# Patient Record
Sex: FEMALE | Race: OTHER | NOT HISPANIC OR LATINO | URBAN - METROPOLITAN AREA
[De-identification: names, ages, dates, MRNs, and addresses within clinical notes are randomized per-mention and may not be internally consistent; named-entity substitution may affect disease eponyms.]

---

## 2022-02-08 ENCOUNTER — OUTPATIENT (OUTPATIENT)
Dept: OUTPATIENT SERVICES | Facility: HOSPITAL | Age: 27
LOS: 1 days | End: 2022-02-08
Payer: MEDICAID

## 2022-02-08 VITALS
OXYGEN SATURATION: 99 % | SYSTOLIC BLOOD PRESSURE: 107 MMHG | WEIGHT: 117.95 LBS | TEMPERATURE: 98 F | HEIGHT: 63 IN | DIASTOLIC BLOOD PRESSURE: 73 MMHG

## 2022-02-08 DIAGNOSIS — Z98.891 HISTORY OF UTERINE SCAR FROM PREVIOUS SURGERY: Chronic | ICD-10-CM

## 2022-02-08 DIAGNOSIS — R10.2 PELVIC AND PERINEAL PAIN: ICD-10-CM

## 2022-02-08 DIAGNOSIS — N83.209 UNSPECIFIED OVARIAN CYST, UNSPECIFIED SIDE: ICD-10-CM

## 2022-02-08 DIAGNOSIS — Z01.818 ENCOUNTER FOR OTHER PREPROCEDURAL EXAMINATION: ICD-10-CM

## 2022-02-08 DIAGNOSIS — N80.0 ENDOMETRIOSIS OF UTERUS: ICD-10-CM

## 2022-02-08 LAB
BLD GP AB SCN SERPL QL: SIGNIFICANT CHANGE UP
HCG SERPL-ACNC: <1 MIU/ML — SIGNIFICANT CHANGE UP
HCT VFR BLD CALC: 36.8 % — SIGNIFICANT CHANGE UP (ref 34.5–45)
HGB BLD-MCNC: 11.9 G/DL — SIGNIFICANT CHANGE UP (ref 11.5–15.5)
MCHC RBC-ENTMCNC: 26.7 PG — LOW (ref 27–34)
MCHC RBC-ENTMCNC: 32.3 GM/DL — SIGNIFICANT CHANGE UP (ref 32–36)
MCV RBC AUTO: 82.7 FL — SIGNIFICANT CHANGE UP (ref 80–100)
NRBC # BLD: 0 /100 WBCS — SIGNIFICANT CHANGE UP (ref 0–0)
PLATELET # BLD AUTO: 232 K/UL — SIGNIFICANT CHANGE UP (ref 150–400)
RBC # BLD: 4.45 M/UL — SIGNIFICANT CHANGE UP (ref 3.8–5.2)
RBC # FLD: 14.3 % — SIGNIFICANT CHANGE UP (ref 10.3–14.5)
WBC # BLD: 3.97 K/UL — SIGNIFICANT CHANGE UP (ref 3.8–10.5)
WBC # FLD AUTO: 3.97 K/UL — SIGNIFICANT CHANGE UP (ref 3.8–10.5)

## 2022-02-08 PROCEDURE — 86900 BLOOD TYPING SEROLOGIC ABO: CPT

## 2022-02-08 PROCEDURE — 86901 BLOOD TYPING SEROLOGIC RH(D): CPT

## 2022-02-08 PROCEDURE — 84702 CHORIONIC GONADOTROPIN TEST: CPT

## 2022-02-08 PROCEDURE — 86850 RBC ANTIBODY SCREEN: CPT

## 2022-02-08 PROCEDURE — G0463: CPT

## 2022-02-08 PROCEDURE — 85027 COMPLETE CBC AUTOMATED: CPT

## 2022-02-08 PROCEDURE — 36415 COLL VENOUS BLD VENIPUNCTURE: CPT

## 2022-02-08 NOTE — H&P PST ADULT - HISTORY OF PRESENT ILLNESS
27 yo Black F for laparoscopic left ovarian cystectomy    treatment of endometriosis   Lysis adhesions   2/15/22      LMP 22   c/o intermittent pain left flank area > 6 mos

## 2022-02-08 NOTE — H&P PST ADULT - ASSESSMENT
25 yo Black F c/o pain left flank area for laparoscopic left ovarian cystectomy   treatment of endometriosis   lysis of adhesions   2/ 15/ 22     Medical clearance pending w labs      COVID swab TBS

## 2022-02-14 RX ORDER — SODIUM CHLORIDE 9 MG/ML
1000 INJECTION, SOLUTION INTRAVENOUS
Refills: 0 | Status: DISCONTINUED | OUTPATIENT
Start: 2022-02-15 | End: 2022-02-15

## 2022-02-14 NOTE — ASU PATIENT PROFILE, ADULT - FALL HARM RISK - UNIVERSAL INTERVENTIONS
Bed in lowest position, wheels locked, appropriate side rails in place/Call bell, personal items and telephone in reach/Non-slip footwear when patient is out of bed/[place] to call system/Physically safe environment - no spills, clutter or unnecessary equipment/Purposeful Proactive Rounding/Room/bathroom lighting operational, light cord in reach

## 2022-02-15 ENCOUNTER — INPATIENT (INPATIENT)
Facility: HOSPITAL | Age: 27
LOS: 0 days | Discharge: HOME | DRG: 743 | End: 2022-02-16
Attending: OBSTETRICS & GYNECOLOGY | Admitting: OBSTETRICS & GYNECOLOGY
Payer: MEDICAID

## 2022-02-15 VITALS
OXYGEN SATURATION: 95 % | DIASTOLIC BLOOD PRESSURE: 89 MMHG | RESPIRATION RATE: 14 BRPM | HEART RATE: 88 BPM | HEIGHT: 63 IN | WEIGHT: 117.95 LBS | SYSTOLIC BLOOD PRESSURE: 130 MMHG | TEMPERATURE: 98 F

## 2022-02-15 DIAGNOSIS — R10.2 PELVIC AND PERINEAL PAIN: ICD-10-CM

## 2022-02-15 DIAGNOSIS — Z98.891 HISTORY OF UTERINE SCAR FROM PREVIOUS SURGERY: Chronic | ICD-10-CM

## 2022-02-15 DIAGNOSIS — N80.0 ENDOMETRIOSIS OF UTERUS: ICD-10-CM

## 2022-02-15 PROCEDURE — 88305 TISSUE EXAM BY PATHOLOGIST: CPT | Mod: 26

## 2022-02-15 DEVICE — ARISTA 3GR: Type: IMPLANTABLE DEVICE | Status: FUNCTIONAL

## 2022-02-15 RX ORDER — SODIUM CHLORIDE 9 MG/ML
1000 INJECTION, SOLUTION INTRAVENOUS
Refills: 0 | Status: DISCONTINUED | OUTPATIENT
Start: 2022-02-15 | End: 2022-02-16

## 2022-02-15 RX ORDER — ONDANSETRON 8 MG/1
4 TABLET, FILM COATED ORAL ONCE
Refills: 0 | Status: DISCONTINUED | OUTPATIENT
Start: 2022-02-15 | End: 2022-02-15

## 2022-02-15 RX ORDER — TRAMADOL HYDROCHLORIDE 50 MG/1
1 TABLET ORAL
Qty: 0 | Refills: 0 | DISCHARGE

## 2022-02-15 RX ORDER — ONDANSETRON 8 MG/1
4 TABLET, FILM COATED ORAL EVERY 8 HOURS
Refills: 0 | Status: DISCONTINUED | OUTPATIENT
Start: 2022-02-15 | End: 2022-02-16

## 2022-02-15 RX ORDER — OXYCODONE HYDROCHLORIDE 5 MG/1
5 TABLET ORAL ONCE
Refills: 0 | Status: DISCONTINUED | OUTPATIENT
Start: 2022-02-15 | End: 2022-02-15

## 2022-02-15 RX ORDER — IBUPROFEN 200 MG
600 TABLET ORAL EVERY 8 HOURS
Refills: 0 | Status: DISCONTINUED | OUTPATIENT
Start: 2022-02-16 | End: 2022-02-16

## 2022-02-15 RX ORDER — HYDROMORPHONE HYDROCHLORIDE 2 MG/ML
0.5 INJECTION INTRAMUSCULAR; INTRAVENOUS; SUBCUTANEOUS EVERY 4 HOURS
Refills: 0 | Status: DISCONTINUED | OUTPATIENT
Start: 2022-02-15 | End: 2022-02-16

## 2022-02-15 RX ORDER — ENOXAPARIN SODIUM 100 MG/ML
40 INJECTION SUBCUTANEOUS DAILY
Refills: 0 | Status: DISCONTINUED | OUTPATIENT
Start: 2022-02-16 | End: 2022-02-16

## 2022-02-15 RX ORDER — KETOROLAC TROMETHAMINE 30 MG/ML
30 SYRINGE (ML) INJECTION EVERY 8 HOURS
Refills: 0 | Status: COMPLETED | OUTPATIENT
Start: 2022-02-15 | End: 2022-02-16

## 2022-02-15 RX ORDER — SODIUM CHLORIDE 9 MG/ML
1000 INJECTION, SOLUTION INTRAVENOUS
Refills: 0 | Status: DISCONTINUED | OUTPATIENT
Start: 2022-02-15 | End: 2022-02-15

## 2022-02-15 RX ORDER — ACETAMINOPHEN 500 MG
1000 TABLET ORAL EVERY 8 HOURS
Refills: 0 | Status: DISCONTINUED | OUTPATIENT
Start: 2022-02-15 | End: 2022-02-16

## 2022-02-15 RX ORDER — MAGNESIUM HYDROXIDE 400 MG/1
30 TABLET, CHEWABLE ORAL DAILY
Refills: 0 | Status: DISCONTINUED | OUTPATIENT
Start: 2022-02-15 | End: 2022-02-16

## 2022-02-15 RX ORDER — HYDROMORPHONE HYDROCHLORIDE 2 MG/ML
0.5 INJECTION INTRAMUSCULAR; INTRAVENOUS; SUBCUTANEOUS
Refills: 0 | Status: DISCONTINUED | OUTPATIENT
Start: 2022-02-15 | End: 2022-02-15

## 2022-02-15 RX ORDER — APREPITANT 80 MG/1
40 CAPSULE ORAL ONCE
Refills: 0 | Status: COMPLETED | OUTPATIENT
Start: 2022-02-15 | End: 2022-02-15

## 2022-02-15 RX ORDER — GENTAMICIN SULFATE 40 MG/ML
80 VIAL (ML) INJECTION ONCE
Refills: 0 | Status: COMPLETED | OUTPATIENT
Start: 2022-02-15 | End: 2022-02-15

## 2022-02-15 RX ORDER — OXYCODONE HYDROCHLORIDE 5 MG/1
5 TABLET ORAL EVERY 6 HOURS
Refills: 0 | Status: DISCONTINUED | OUTPATIENT
Start: 2022-02-15 | End: 2022-02-16

## 2022-02-15 RX ADMIN — HYDROMORPHONE HYDROCHLORIDE 0.5 MILLIGRAM(S): 2 INJECTION INTRAMUSCULAR; INTRAVENOUS; SUBCUTANEOUS at 22:32

## 2022-02-15 RX ADMIN — Medication 30 MILLIGRAM(S): at 22:32

## 2022-02-15 RX ADMIN — SODIUM CHLORIDE 75 MILLILITER(S): 9 INJECTION, SOLUTION INTRAVENOUS at 14:06

## 2022-02-15 RX ADMIN — HYDROMORPHONE HYDROCHLORIDE 0.5 MILLIGRAM(S): 2 INJECTION INTRAMUSCULAR; INTRAVENOUS; SUBCUTANEOUS at 21:17

## 2022-02-15 RX ADMIN — Medication 1000 MILLIGRAM(S): at 22:17

## 2022-02-15 RX ADMIN — Medication 1000 MILLIGRAM(S): at 21:17

## 2022-02-15 RX ADMIN — Medication 30 MILLIGRAM(S): at 21:17

## 2022-02-15 RX ADMIN — SODIUM CHLORIDE 100 MILLILITER(S): 9 INJECTION, SOLUTION INTRAVENOUS at 08:26

## 2022-02-15 RX ADMIN — APREPITANT 40 MILLIGRAM(S): 80 CAPSULE ORAL at 08:49

## 2022-02-15 NOTE — PATIENT PROFILE ADULT - FALL HARM RISK - UNIVERSAL INTERVENTIONS
Bed in lowest position, wheels locked, appropriate side rails in place/Call bell, personal items and telephone in reach/Instruct patient to call for assistance before getting out of bed or chair/Non-slip footwear when patient is out of bed/[place] to call system/Physically safe environment - no spills, clutter or unnecessary equipment/Purposeful Proactive Rounding/Room/bathroom lighting operational, light cord in reach

## 2022-02-15 NOTE — PATIENT PROFILE ADULT - NSPRONUTRITIONRISK_GEN_A_NUR
Renal Diet  c/w Renagel 800mg POTID with meals   Calcijex on HD Renal Diet  c/w Renagel 800mg POTID with meals No indicators present

## 2022-02-15 NOTE — CHART NOTE - NSCHARTNOTEFT_GEN_A_CORE
CALLED BY JINA LOCKETT PATIENT WOULD LIKE TO SPEAK WITH DR. CHAUDHARI OR ASSISTANT PRESENT AT THE TIME OF SURGERY    ADVISED RN TO CALL DR. CHAUDHARI AND PUT PATIENT IN CONTACT WITH HIM

## 2022-02-15 NOTE — BRIEF OPERATIVE NOTE - NSICDXBRIEFPROCEDURE_GEN_ALL_CORE_FT
PROCEDURES:  Excision, endometrioma, laparoscopic 15-Feb-2022 13:36:16 from bowel,  diaphragmatic wall, posterior cul de sac Jaclyn Rivera  Cystoscopy 15-Feb-2022 13:42:14  Jaclyn Rivera  Hysteroscopy, without dilation and curettage 15-Feb-2022 13:42:34  Jaclyn Rivera

## 2022-02-15 NOTE — BRIEF OPERATIVE NOTE - OPERATION/FINDINGS
intense adhesive/ infiltrative endometriosis noted on diaphragmatic wall, posterior cul de sac and large bowel, with adhesions of large bowel to anterior margins of uterus, left endometrioma

## 2022-02-15 NOTE — BRIEF OPERATIVE NOTE - NSICDXBRIEFPREOP_GEN_ALL_CORE_FT
PRE-OP DIAGNOSIS:  Endometrioma 15-Feb-2022 13:35:00  Jaclyn Rivera  Endometriosis 15-Feb-2022 13:35:13  Jaclyn Rivera

## 2022-02-16 VITALS
TEMPERATURE: 100 F | DIASTOLIC BLOOD PRESSURE: 65 MMHG | OXYGEN SATURATION: 94 % | RESPIRATION RATE: 17 BRPM | SYSTOLIC BLOOD PRESSURE: 103 MMHG | HEART RATE: 63 BPM

## 2022-02-16 PROCEDURE — C1889: CPT

## 2022-02-16 PROCEDURE — 88305 TISSUE EXAM BY PATHOLOGIST: CPT

## 2022-02-16 PROCEDURE — C9399: CPT

## 2022-02-16 RX ORDER — IBUPROFEN 200 MG
0 TABLET ORAL
Qty: 0 | Refills: 0 | DISCHARGE

## 2022-02-16 RX ORDER — ACETAMINOPHEN 500 MG
2 TABLET ORAL
Qty: 0 | Refills: 0 | DISCHARGE
Start: 2022-02-16

## 2022-02-16 RX ADMIN — Medication 30 MILLIGRAM(S): at 05:32

## 2022-02-16 RX ADMIN — Medication 30 MILLIGRAM(S): at 05:47

## 2022-02-16 NOTE — DISCHARGE NOTE NURSING/CASE MANAGEMENT/SOCIAL WORK - NSDCPEFALRISK_GEN_ALL_CORE
For information on Fall & Injury Prevention, visit: [email or web address] OR  [email or web address] OR  https://www.cdc.gov/steadi/patient.html

## 2022-02-16 NOTE — DISCHARGE NOTE PROVIDER - NSDCCPTREATMENT_GEN_ALL_CORE_FT
PRINCIPAL PROCEDURE  Procedure: Excision, endometrioma, laparoscopic  Findings and Treatment: from bowel,  diaphragmatic wall, posterior cul de sac      SECONDARY PROCEDURE  Procedure: Cystoscopy  Findings and Treatment:     Procedure: Hysteroscopy, without dilation and curettage  Findings and Treatment:     Procedure: Lysis of adhesions of uterus  Findings and Treatment:

## 2022-02-16 NOTE — DISCHARGE NOTE PROVIDER - HOSPITAL COURSE
26 year old female P1 with stage IV endometriosis. Pt presented 2/15 for laparoscopic treatment of endometriosis, LOS, diagnostic hysteroscopy, cystoscopy. Unremarkable intra-op and post op course. Pain controlled well post-op. Will f/u with Dr. De La O in 1-2 weeks.

## 2022-02-16 NOTE — DISCHARGE NOTE PROVIDER - NSDCFUADDINST_GEN_ALL_CORE_FT
You may resume over the counter anti-inflammatory medications as previously taken.  Refrain from heavy lifting or strenuous activities until you see Dr. De La O.  You may shower, but do not scrub surgical incisions. Allow water to gently flow over sites.

## 2022-02-16 NOTE — PROGRESS NOTE ADULT - SUBJECTIVE AND OBJECTIVE BOX
25yo, P1 with stage IV endometriosis, s/p laparoscopic treatment of endometriosis, LOS, diagnostic hysteroscopy, cystoscopy. POD#1      S: patient feels well, tolerate pain, tolerate diet. no fever/chills    O: VSS,  -NAD  -CTA B/L  -ABD soft, none tender, no distend, BS+  -urinating and ambulating well    A/P: patient recovers well from surgery. Clinically stable  -D/C home  -F/U clinic for post-OP check in 4 wks
JOANNE BURR  MRN-[id number] 26y    GYN SURGERY ROUTINE POST OP CHECK / DR. CHAUDHARI     PATIENT INTERVIEWED, EXAMINED WITH NURSE CATHRYN  PRESENT IN THE ROOM ENTIRE TIME     SHE OFFERS NO COMPLAINTS, HOWEVER DOES NOT WISH TO BE EXAMINED       MEDICATIONS  (STANDING):  acetaminophen     Tablet .. 1000 milliGRAM(s) Oral every 8 hours  ketorolac   Injectable 30 milliGRAM(s) IV Push every 8 hours  lactated ringers. 1000 milliLiter(s) (100 mL/Hr) IV Continuous <Continuous>    MEDICATIONS  (PRN):  HYDROmorphone  Injectable 0.5 milliGRAM(s) IV Push every 4 hours PRN Severe Pain (7 - 10)  magnesium hydroxide Suspension 30 milliLiter(s) Oral daily PRN Constipation  ondansetron Injectable 4 milliGRAM(s) IV Push every 8 hours PRN Nausea and/or Vomiting  oxyCODONE    IR 5 milliGRAM(s) Oral every 6 hours PRN Moderate Pain (4 - 6)       Vital Signs Last 24 Hrs  T(C): 37 (15 Feb 2022 16:20), Max: 37.2 (15 Feb 2022 13:18)  T(F): 98.6 (15 Feb 2022 16:20), Max: 99 (15 Feb 2022 13:18)  HR: 69 (15 Feb 2022 16:20) (61 - 92)  BP: 126/75 (15 Feb 2022 16:20) (103/55 - 133/79)  RR: 14 (15 Feb 2022 16:20) (12 - 18)  SpO2: 100% (15 Feb 2022 15:48) (94% - 100%)    02-15-22 @ 07:01  -  02-15-22 @ 19:00  --------------------------------------------------------  IN: 465 mL / OUT: 200 mL / NET: 265 mL    ABDOMEN INSPECTION : ALL TROCAR SITES DRY AND INTACT WITH STERI- STRIP IN PLACE                               ASSESSMENT &  PLAN:     S/P LAPAROSCOPIC EXCISION OF ENDOMETRIOMA     REGULAR DIET  DVT PROPHYLAXIS  D/C PLAN IN AM   SURGICAL TEAM WILL FOLLOW UP      
Chart reviewed electronically per covid protocol.    26y Female    T(C): 37.6 (02-16-22 @ 06:20), Max: 37.6 (02-16-22 @ 06:20)  HR: 63 (02-16-22 @ 06:20) (58 - 92)  BP: 103/65 (02-16-22 @ 06:20) (103/55 - 133/79)  RR: 17 (02-16-22 @ 06:20) (12 - 18)  SpO2: 94% (02-16-22 @ 06:20) (92% - 100%)  Wt(kg): --    No Nausea/vomiting, recall, sore throat or headache reported.    No anesthesia related complaints or sequelae. To discharge today per gyn.

## 2022-02-16 NOTE — DISCHARGE NOTE PROVIDER - NSDCMRMEDTOKEN_GEN_ALL_CORE_FT
acetaminophen 500 mg oral tablet: 2 tab(s) orally every 8 hours  Percocet 5/325 oral tablet: 1 tab(s) orally every 6 hours  traMADol 50 mg oral tablet: 1 tab(s) orally every 6 hours

## 2022-02-16 NOTE — DISCHARGE NOTE PROVIDER - CARE PROVIDER_API CALL
Riley De La O)  Obstetrics and Gynecology  [facility] [place] [address]  Phone: [phone]  Fax: [phone]  Follow Up Time:

## 2022-02-16 NOTE — DISCHARGE NOTE NURSING/CASE MANAGEMENT/SOCIAL WORK - PATIENT PORTAL LINK FT
You can access the FollowMyHealth Patient Portal offered by [facility] by registering at the following website: [email or web address]. By joining [facility]’s FollowMyHealth portal, you will also be able to view your health information using other applications (apps) compatible with our system.

## 2022-06-07 NOTE — ASU PATIENT PROFILE, ADULT - BARIATRIC
no Klisyri Pregnancy And Lactation Text: It is unknown if this medication can harm a developing fetus or if it is excreted in breast milk.

## 2025-05-21 NOTE — H&P PST ADULT - PROBLEM SELECTOR PLAN 1
A balloon catheter was inserted. Supply used: (CATHETER BLN DIL L145 CM L20 MM ODSEC3.5 MM NC TREK BRIAN RPD). for laparoscopic ovarian cystectomy  2/15/22

## (undated) DEVICE — GLV 7.5 PROTEXIS (WHITE)

## (undated) DEVICE — VENODYNE/SCD SLEEVE CALF LARGE

## (undated) DEVICE — DRAPE LIGHT HANDLE COVER (GREEN)

## (undated) DEVICE — SHEARS HARMONIC ACE PLUS 7 5MM X 36CM CURVED TIP

## (undated) DEVICE — PREP KIT SKIN PREP DRY

## (undated) DEVICE — GLV 8 PROTEXIS (BLUE)

## (undated) DEVICE — SOL INJ LR 1000ML

## (undated) DEVICE — TUBING TUR 2 PRONG

## (undated) DEVICE — DRAPE TOWEL BLUE 17" X 24"

## (undated) DEVICE — TUBING SUCTION 20FT

## (undated) DEVICE — Device

## (undated) DEVICE — PACK LITHOTOMY

## (undated) DEVICE — SOL IRR POUR H2O 1000ML

## (undated) DEVICE — WARMING BLANKET UPPER ADULT

## (undated) DEVICE — SUT VLOC 180 3-0 6" P-12 UNDYED

## (undated) DEVICE — FOLEY TRAY 16FR LF URINE METER SURESTEP

## (undated) DEVICE — VENODYNE/SCD SLEEVE CALF MEDIUM